# Patient Record
Sex: FEMALE | Race: OTHER | HISPANIC OR LATINO | URBAN - METROPOLITAN AREA
[De-identification: names, ages, dates, MRNs, and addresses within clinical notes are randomized per-mention and may not be internally consistent; named-entity substitution may affect disease eponyms.]

---

## 2017-04-19 ENCOUNTER — EMERGENCY (EMERGENCY)
Facility: HOSPITAL | Age: 57
LOS: 1 days | Discharge: HOME | End: 2017-04-19
Attending: EMERGENCY MEDICINE
Payer: MEDICAID

## 2017-04-19 VITALS
TEMPERATURE: 98 F | SYSTOLIC BLOOD PRESSURE: 127 MMHG | OXYGEN SATURATION: 100 % | HEIGHT: 62 IN | DIASTOLIC BLOOD PRESSURE: 75 MMHG | HEART RATE: 72 BPM | RESPIRATION RATE: 18 BRPM | WEIGHT: 139.99 LBS

## 2017-04-19 VITALS
DIASTOLIC BLOOD PRESSURE: 74 MMHG | OXYGEN SATURATION: 100 % | RESPIRATION RATE: 18 BRPM | TEMPERATURE: 98 F | SYSTOLIC BLOOD PRESSURE: 122 MMHG | HEART RATE: 68 BPM

## 2017-04-19 DIAGNOSIS — Z87.891 PERSONAL HISTORY OF NICOTINE DEPENDENCE: ICD-10-CM

## 2017-04-19 DIAGNOSIS — R07.89 OTHER CHEST PAIN: ICD-10-CM

## 2017-04-19 DIAGNOSIS — F32.9 MAJOR DEPRESSIVE DISORDER, SINGLE EPISODE, UNSPECIFIED: ICD-10-CM

## 2017-04-19 LAB
ALBUMIN SERPL ELPH-MCNC: 3.6 G/DL — SIGNIFICANT CHANGE UP (ref 3.5–5)
ALP SERPL-CCNC: 99 U/L — SIGNIFICANT CHANGE UP (ref 40–120)
ALT FLD-CCNC: 26 U/L DA — SIGNIFICANT CHANGE UP (ref 10–60)
ANION GAP SERPL CALC-SCNC: 8 MMOL/L — SIGNIFICANT CHANGE UP (ref 5–17)
AST SERPL-CCNC: 20 U/L — SIGNIFICANT CHANGE UP (ref 10–40)
BASOPHILS # BLD AUTO: 0.1 K/UL — SIGNIFICANT CHANGE UP (ref 0–0.2)
BASOPHILS NFR BLD AUTO: 1.1 % — SIGNIFICANT CHANGE UP (ref 0–2)
BILIRUB SERPL-MCNC: 0.2 MG/DL — SIGNIFICANT CHANGE UP (ref 0.2–1.2)
BUN SERPL-MCNC: 24 MG/DL — HIGH (ref 7–18)
CALCIUM SERPL-MCNC: 8.5 MG/DL — SIGNIFICANT CHANGE UP (ref 8.4–10.5)
CHLORIDE SERPL-SCNC: 106 MMOL/L — SIGNIFICANT CHANGE UP (ref 96–108)
CK MB BLD-MCNC: 1.4 % — SIGNIFICANT CHANGE UP (ref 0–3.5)
CK MB CFR SERPL CALC: 1.4 NG/ML — SIGNIFICANT CHANGE UP (ref 0–3.6)
CK SERPL-CCNC: 100 U/L — SIGNIFICANT CHANGE UP (ref 21–215)
CO2 SERPL-SCNC: 27 MMOL/L — SIGNIFICANT CHANGE UP (ref 22–31)
CREAT SERPL-MCNC: 1.01 MG/DL — SIGNIFICANT CHANGE UP (ref 0.5–1.3)
D DIMER BLD IA.RAPID-MCNC: <150 NG/ML DDU — SIGNIFICANT CHANGE UP
EOSINOPHIL # BLD AUTO: 0.2 K/UL — SIGNIFICANT CHANGE UP (ref 0–0.5)
EOSINOPHIL NFR BLD AUTO: 2.1 % — SIGNIFICANT CHANGE UP (ref 0–6)
GLUCOSE SERPL-MCNC: 102 MG/DL — HIGH (ref 70–99)
HCT VFR BLD CALC: 41.5 % — SIGNIFICANT CHANGE UP (ref 34.5–45)
HGB BLD-MCNC: 14 G/DL — SIGNIFICANT CHANGE UP (ref 11.5–15.5)
LIDOCAIN IGE QN: 231 U/L — SIGNIFICANT CHANGE UP (ref 73–393)
LYMPHOCYTES # BLD AUTO: 4.7 K/UL — HIGH (ref 1–3.3)
LYMPHOCYTES # BLD AUTO: 48.2 % — HIGH (ref 13–44)
MCHC RBC-ENTMCNC: 30.1 PG — SIGNIFICANT CHANGE UP (ref 27–34)
MCHC RBC-ENTMCNC: 33.7 GM/DL — SIGNIFICANT CHANGE UP (ref 32–36)
MCV RBC AUTO: 89.4 FL — SIGNIFICANT CHANGE UP (ref 80–100)
MONOCYTES # BLD AUTO: 0.6 K/UL — SIGNIFICANT CHANGE UP (ref 0–0.9)
MONOCYTES NFR BLD AUTO: 5.8 % — SIGNIFICANT CHANGE UP (ref 2–14)
NEUTROPHILS # BLD AUTO: 4.2 K/UL — SIGNIFICANT CHANGE UP (ref 1.8–7.4)
NEUTROPHILS NFR BLD AUTO: 42.8 % — LOW (ref 43–77)
NT-PROBNP SERPL-SCNC: 36 PG/ML — SIGNIFICANT CHANGE UP (ref 0–125)
PLATELET # BLD AUTO: 209 K/UL — SIGNIFICANT CHANGE UP (ref 150–400)
POTASSIUM SERPL-MCNC: 3.7 MMOL/L — SIGNIFICANT CHANGE UP (ref 3.5–5.3)
POTASSIUM SERPL-SCNC: 3.7 MMOL/L — SIGNIFICANT CHANGE UP (ref 3.5–5.3)
PROT SERPL-MCNC: 6.7 G/DL — SIGNIFICANT CHANGE UP (ref 6–8.3)
RBC # BLD: 4.64 M/UL — SIGNIFICANT CHANGE UP (ref 3.8–5.2)
RBC # FLD: 11.4 % — SIGNIFICANT CHANGE UP (ref 10.3–14.5)
SODIUM SERPL-SCNC: 141 MMOL/L — SIGNIFICANT CHANGE UP (ref 135–145)
TROPONIN I SERPL-MCNC: <0.015 NG/ML — SIGNIFICANT CHANGE UP (ref 0–0.04)
WBC # BLD: 9.8 K/UL — SIGNIFICANT CHANGE UP (ref 3.8–10.5)
WBC # FLD AUTO: 9.8 K/UL — SIGNIFICANT CHANGE UP (ref 3.8–10.5)

## 2017-04-19 PROCEDURE — 83690 ASSAY OF LIPASE: CPT

## 2017-04-19 PROCEDURE — 93005 ELECTROCARDIOGRAM TRACING: CPT

## 2017-04-19 PROCEDURE — 99283 EMERGENCY DEPT VISIT LOW MDM: CPT | Mod: 25

## 2017-04-19 PROCEDURE — 85027 COMPLETE CBC AUTOMATED: CPT

## 2017-04-19 PROCEDURE — 71020: CPT | Mod: 26

## 2017-04-19 PROCEDURE — 84484 ASSAY OF TROPONIN QUANT: CPT

## 2017-04-19 PROCEDURE — 80053 COMPREHEN METABOLIC PANEL: CPT

## 2017-04-19 PROCEDURE — 83880 ASSAY OF NATRIURETIC PEPTIDE: CPT

## 2017-04-19 PROCEDURE — 71046 X-RAY EXAM CHEST 2 VIEWS: CPT

## 2017-04-19 PROCEDURE — 82553 CREATINE MB FRACTION: CPT

## 2017-04-19 PROCEDURE — 85379 FIBRIN DEGRADATION QUANT: CPT

## 2017-04-19 PROCEDURE — 82550 ASSAY OF CK (CPK): CPT

## 2017-04-19 PROCEDURE — 99285 EMERGENCY DEPT VISIT HI MDM: CPT | Mod: 25

## 2017-04-19 RX ORDER — SODIUM CHLORIDE 9 MG/ML
3 INJECTION INTRAMUSCULAR; INTRAVENOUS; SUBCUTANEOUS ONCE
Qty: 0 | Refills: 0 | Status: COMPLETED | OUTPATIENT
Start: 2017-04-19 | End: 2017-04-19

## 2017-04-19 RX ADMIN — SODIUM CHLORIDE 3 MILLILITER(S): 9 INJECTION INTRAMUSCULAR; INTRAVENOUS; SUBCUTANEOUS at 04:18

## 2017-04-19 NOTE — ED PROVIDER NOTE - NS ED ROS FT
ROS: GENERAL: no fevers, +chills HEENT: no epistaxis, no eye pain, no ear, no throat pain CARDIAC: +chest pain, +shortness of breath PULM: no cough, +shortness of breath GI: +nausea, no vomiting, no diarrhea,  +abdominal pain, no hematemesis, no bright red blood per rectum : no dysuria, no hematuria EXTREMITIES: no arm pain, no leg pain, no back pain SKIN: no purpura, no petechiae NEURO: no headache, no neck pain, no loss of strength/sensation HEME: no easy bruising, no easy bleeding

## 2017-04-19 NOTE — ED PROVIDER NOTE - OBJECTIVE STATEMENT
57f pmhx depression p/w CP, abdo pain. started 20 min ago, woke pt from sleep vs. noted on waking, severe, across entire chest in addition to diffuse abdo pain, worse in epigastrium a/w SOB, nausea without vomiting. denies constipation/diarrhea, dysuria/hematuria. no sick contacts. went to bed in usual state of health. no FHx of CAD, never smoker.

## 2017-04-19 NOTE — ED PROVIDER NOTE - PROGRESS NOTE DETAILS
pt reports improvement upon arrival in ED labs, CXR, EKG all wnl, will d/c home, pt states sx resolved.

## 2017-04-19 NOTE — ED ADULT NURSE NOTE - OBJECTIVE STATEMENT
pt awake alert oriented x 3 not in distress,with complaint of chest pain woke up with pain this morning,no sob,with daughter at bedside

## 2017-04-19 NOTE — ED PROVIDER NOTE - MEDICAL DECISION MAKING DETAILS
57f pmhx depression p/w CP, abdo pain. started 20 min ago, woke pt from sleep vs. noted on waking, severe, across entire chest in addition to diffuse abdo pain, worse in epigastrium a/w SOB, nausea without vomiting. denies constipation/diarrhea, dysuria/hematuria. no sick contacts. went to bed in usual state of health. no FHx of CAD, never smoker. on PE, VSS, in moderate distress, CTA b/l, RRR, abdo soft, non-TTP, will send cardiac panel.

## 2019-12-06 NOTE — ED ADULT TRIAGE NOTE - MEANS OF ARRIVAL
Subjective:      Patient ID: Smitha Salinas is a 69 y.o. female.    Chief Complaint: Sore Throat    Sore Throat    This is a new problem. The current episode started yesterday. The problem has been gradually worsening. There has been no fever. Associated symptoms include congestion and coughing. Pertinent negatives include no abdominal pain, diarrhea, drooling, ear discharge, ear pain, headaches, hoarse voice, plugged ear sensation, neck pain, shortness of breath, stridor, swollen glands, trouble swallowing or vomiting. Treatments tried: OTC meds. The treatment provided moderate relief.   Knee Pain    There was no injury mechanism. The pain is present in the right knee and left knee. The pain has been worsening since onset. Pertinent negatives include no inability to bear weight, loss of motion, loss of sensation, muscle weakness, numbness or tingling. Nothing aggravates the symptoms. Treatments tried: tramadol. The treatment provided no relief.   Has had knee injections with Dr. Dick Coronado a pain management specialist.      Review of Systems   Constitutional: Negative for activity change, appetite change, chills, diaphoresis, fatigue, fever and unexpected weight change.   HENT: Positive for congestion, postnasal drip, rhinorrhea and sore throat. Negative for drooling, ear discharge, ear pain, hearing loss, hoarse voice, sinus pressure, sinus pain, sneezing, trouble swallowing and voice change.    Eyes: Negative.  Negative for visual disturbance.   Respiratory: Positive for cough. Negative for choking, chest tightness, shortness of breath and stridor.    Cardiovascular: Negative for chest pain, palpitations and leg swelling.   Gastrointestinal: Negative for abdominal distention, abdominal pain, blood in stool, constipation, diarrhea, nausea and vomiting.   Endocrine: Negative for cold intolerance, heat intolerance, polydipsia and polyuria.   Genitourinary: Negative.  Negative for difficulty urinating and  "frequency.   Musculoskeletal: Positive for arthralgias. Negative for back pain, gait problem, joint swelling, myalgias, neck pain and neck stiffness.   Skin: Negative for color change, pallor, rash and wound.   Neurological: Negative for dizziness, tingling, tremors, weakness, light-headedness, numbness and headaches.   Hematological: Negative for adenopathy.   Psychiatric/Behavioral: Negative for behavioral problems, confusion, self-injury, sleep disturbance and suicidal ideas. The patient is not nervous/anxious.      Objective:   /70 (BP Location: Left arm, Patient Position: Sitting, BP Method: Large (Manual))   Pulse 60   Temp 97.8 °F (36.6 °C) (Tympanic)   Ht 5' 5" (1.651 m)   Wt 85 kg (187 lb 6.3 oz)   SpO2 98%   BMI 31.18 kg/m²     Physical Exam   Constitutional: She is oriented to person, place, and time. She appears well-developed and well-nourished. No distress.   HENT:   Head: Normocephalic and atraumatic.   Right Ear: External ear normal.   Left Ear: External ear normal.   Nose: Mucosal edema and rhinorrhea present. Right sinus exhibits no maxillary sinus tenderness and no frontal sinus tenderness. Left sinus exhibits no maxillary sinus tenderness and no frontal sinus tenderness.   Mouth/Throat: Uvula is midline and mucous membranes are normal. Oropharyngeal exudate and posterior oropharyngeal erythema present. No posterior oropharyngeal edema. No tonsillar exudate.   Eyes: Pupils are equal, round, and reactive to light. Conjunctivae and EOM are normal.   Neck: Normal range of motion. Neck supple.   Cardiovascular: Normal rate, regular rhythm and normal heart sounds. Exam reveals no gallop and no friction rub.   No murmur heard.  Pulmonary/Chest: Effort normal and breath sounds normal. No respiratory distress. She has no wheezes. She has no rales. She exhibits no tenderness.   Abdominal: Soft. She exhibits no distension. There is no tenderness.   Musculoskeletal: Normal range of motion. "   Lymphadenopathy:     She has no cervical adenopathy.   Neurological: She is alert and oriented to person, place, and time.   Skin: Skin is warm and dry. She is not diaphoretic.   Psychiatric: She has a normal mood and affect. Her behavior is normal. Judgment and thought content normal.   Vitals reviewed.      Assessment:     1. Acute pharyngitis, unspecified etiology    2. Allergic rhinitis, unspecified seasonality, unspecified trigger    3. Chronic pain of both knees      Plan:   Acute pharyngitis, unspecified etiology  -     betamethasone acetate-betamethasone sodium phosphate injection 9 mg    Allergic rhinitis, unspecified seasonality, unspecified trigger  -     fluticasone propionate (FLONASE) 50 mcg/actuation nasal spray; 1 spray (50 mcg total) by Each Nostril route once daily.  Dispense: 1 Bottle; Refill: 0    Chronic pain of both knees    -cont. Follow up with rheum and pain specialist.    Follow up if symptoms worsen or fail to improve.       ambulatory

## 2020-01-23 ENCOUNTER — EMERGENCY (EMERGENCY)
Facility: HOSPITAL | Age: 60
LOS: 1 days | Discharge: HOME | End: 2020-01-23
Attending: EMERGENCY MEDICINE
Payer: MEDICAID

## 2020-01-23 VITALS
TEMPERATURE: 98 F | OXYGEN SATURATION: 98 % | SYSTOLIC BLOOD PRESSURE: 125 MMHG | HEART RATE: 76 BPM | DIASTOLIC BLOOD PRESSURE: 85 MMHG | RESPIRATION RATE: 16 BRPM | WEIGHT: 130.07 LBS

## 2020-01-23 LAB
APPEARANCE UR: CLEAR — SIGNIFICANT CHANGE UP
BILIRUB UR-MCNC: NEGATIVE — SIGNIFICANT CHANGE UP
COLOR SPEC: YELLOW — SIGNIFICANT CHANGE UP
DIFF PNL FLD: NEGATIVE — SIGNIFICANT CHANGE UP
GLUCOSE UR QL: NEGATIVE — SIGNIFICANT CHANGE UP
KETONES UR-MCNC: NEGATIVE — SIGNIFICANT CHANGE UP
LEUKOCYTE ESTERASE UR-ACNC: NEGATIVE — SIGNIFICANT CHANGE UP
NITRITE UR-MCNC: NEGATIVE — SIGNIFICANT CHANGE UP
PH UR: 7 — SIGNIFICANT CHANGE UP (ref 5–8)
PROT UR-MCNC: NEGATIVE — SIGNIFICANT CHANGE UP
SP GR SPEC: 1.01 — SIGNIFICANT CHANGE UP (ref 1.01–1.02)
UROBILINOGEN FLD QL: NEGATIVE — SIGNIFICANT CHANGE UP

## 2020-01-23 PROCEDURE — 99283 EMERGENCY DEPT VISIT LOW MDM: CPT

## 2020-01-23 RX ORDER — MECLIZINE HCL 12.5 MG
1 TABLET ORAL
Qty: 15 | Refills: 0
Start: 2020-01-23

## 2020-01-23 RX ORDER — CEFPODOXIME PROXETIL 100 MG
200 TABLET ORAL EVERY 12 HOURS
Refills: 0 | Status: DISCONTINUED | OUTPATIENT
Start: 2020-01-23 | End: 2020-02-03

## 2020-01-23 RX ORDER — CEFPODOXIME PROXETIL 100 MG
1 TABLET ORAL
Qty: 20 | Refills: 0
Start: 2020-01-23 | End: 2020-02-01

## 2020-01-23 RX ORDER — MECLIZINE HCL 12.5 MG
50 TABLET ORAL ONCE
Refills: 0 | Status: COMPLETED | OUTPATIENT
Start: 2020-01-23 | End: 2020-01-23

## 2020-01-23 NOTE — ED PROVIDER NOTE - OBJECTIVE STATEMENT
59 year old female with PMHx of depression and no pertinent PSHx presents to the ED with complaints of vertigo, bilateral ear pain, dysuria, and flank pain. Patient reports that she was diagnosed with an ear infection one month ago, and that at the time she was prescribed Augmentin after which her symptoms resolved. Patient states that she did not have any vertigo at the time of her ear infection, and otherwise does not have any history of vertigo. Patient states that today her vertigo is worsened when she looks to the right. Patient otherwise denies any weakness, numbness, dysarthria, trouble with speech, difficulty swallowing, vision changes, and all other acute complaints. NKDA.

## 2020-01-23 NOTE — ED PROVIDER NOTE - PATIENT PORTAL LINK FT
You can access the FollowMyHealth Patient Portal offered by [facility] by registering at the following website: [email or web address]. By joining [employer]’s FollowMyHealth portal, you will also be able to view your health information using other applications (apps) compatible with our system.

## 2020-01-23 NOTE — ED PROVIDER NOTE - PROGRESS NOTE DETAILS
(Eugene) - s/o to f/u administration of meds. Pt feeling better. Neuro exam wnl. Ambulatory in ED. Will dc. Discussed indications for patient return to ED. Patient understood.

## 2020-01-23 NOTE — ED PROVIDER NOTE - CLINICAL SUMMARY MEDICAL DECISION MAKING FREE TEXT BOX
Patient will be treated for otitis. Will check urine and treat for pyelo as well. Half somersault did not improve patient's vertigo. Will give meclizine and discharge with Meclizine.

## 2020-01-23 NOTE — ED PROVIDER NOTE - NSFOLLOWUPINSTRUCTIONS_ED_ALL_ED_FT
VERTIGO - AfterCare(R) Instructions(ER/ED)     Vertigo    WHAT YOU NEED TO KNOW:    Vertigo is a condition that causes you to feel dizzy. You may feel that you or everything around you is moving or spinning. You may also feel like you are being pulled down or toward your side.     DISCHARGE INSTRUCTIONS:    Return to the emergency department if:     You have a headache and a stiff neck.      You have shaking chills and a fever.       You vomit over and over with no relief.       You have blood, pus, or fluid coming out of your ears.      You are confused.     Contact your healthcare provider if:     Your symptoms do not get better with treatment.       You have questions about your condition or care.    Medicines:     Medicine may be given to help relieve your symptoms.      Take your medicine as directed. Contact your healthcare provider if you think your medicine is not helping or if you have side effects. Tell him or her if you are allergic to any medicine. Keep a list of the medicines, vitamins, and herbs you take. Include the amounts, and when and why you take them. Bring the list or the pill bottles to follow-up visits. Carry your medicine list with you in case of an emergency.    Manage your symptoms:     Do not drive, walk without help, or operate heavy machinery when you are dizzy.       Move slowly when you move from one position to another position. Get up slowly from sitting or lying down. Sit or lie down right away if you feel dizzy.      Drink plenty of liquids. Liquids help prevent dehydration. Ask how much liquid to drink each day and which liquids are best for you.      Vestibular and balance rehabilitation therapy (VBRT) is used to teach you exercises to improve your balance and strength. These exercises may help decrease your vertigo and improve your balance. Ask for more information about this therapy.    Follow up with your healthcare provider as directed: Write down your questions so you remember to ask them during your visits.        © Copyright [employer] 2020       back to top                      © Copyright [employer] 2020

## 2020-01-24 PROBLEM — F32.9 MAJOR DEPRESSIVE DISORDER, SINGLE EPISODE, UNSPECIFIED: Chronic | Status: ACTIVE | Noted: 2017-04-19

## 2020-01-24 PROCEDURE — 99283 EMERGENCY DEPT VISIT LOW MDM: CPT

## 2020-01-24 PROCEDURE — 81003 URINALYSIS AUTO W/O SCOPE: CPT

## 2020-01-24 PROCEDURE — 87086 URINE CULTURE/COLONY COUNT: CPT

## 2020-01-24 RX ADMIN — Medication 200 MILLIGRAM(S): at 00:42

## 2020-01-24 RX ADMIN — Medication 50 MILLIGRAM(S): at 00:42

## 2020-01-25 LAB
CULTURE RESULTS: NO GROWTH — SIGNIFICANT CHANGE UP
SPECIMEN SOURCE: SIGNIFICANT CHANGE UP

## 2020-08-14 ENCOUNTER — EMERGENCY (EMERGENCY)
Facility: HOSPITAL | Age: 60
LOS: 1 days | Discharge: HOME | End: 2020-08-14
Attending: EMERGENCY MEDICINE
Payer: MEDICAID

## 2020-08-14 VITALS
TEMPERATURE: 98 F | HEIGHT: 61 IN | WEIGHT: 134.92 LBS | RESPIRATION RATE: 22 BRPM | DIASTOLIC BLOOD PRESSURE: 87 MMHG | OXYGEN SATURATION: 99 % | HEART RATE: 85 BPM | SYSTOLIC BLOOD PRESSURE: 137 MMHG

## 2020-08-14 VITALS
OXYGEN SATURATION: 99 % | TEMPERATURE: 98 F | RESPIRATION RATE: 22 BRPM | DIASTOLIC BLOOD PRESSURE: 92 MMHG | HEART RATE: 79 BPM | SYSTOLIC BLOOD PRESSURE: 131 MMHG

## 2020-08-14 LAB
ALBUMIN SERPL ELPH-MCNC: 3.5 G/DL — SIGNIFICANT CHANGE UP (ref 3.5–5)
ALP SERPL-CCNC: 95 U/L — SIGNIFICANT CHANGE UP (ref 40–120)
ALT FLD-CCNC: 23 U/L DA — SIGNIFICANT CHANGE UP (ref 10–60)
ANION GAP SERPL CALC-SCNC: 4 MMOL/L — LOW (ref 5–17)
APPEARANCE UR: CLEAR — SIGNIFICANT CHANGE UP
AST SERPL-CCNC: 23 U/L — SIGNIFICANT CHANGE UP (ref 10–40)
BASOPHILS # BLD AUTO: 0.08 K/UL — SIGNIFICANT CHANGE UP (ref 0–0.2)
BASOPHILS NFR BLD AUTO: 1 % — SIGNIFICANT CHANGE UP (ref 0–2)
BILIRUB SERPL-MCNC: 0.2 MG/DL — SIGNIFICANT CHANGE UP (ref 0.2–1.2)
BILIRUB UR-MCNC: NEGATIVE — SIGNIFICANT CHANGE UP
BUN SERPL-MCNC: 15 MG/DL — SIGNIFICANT CHANGE UP (ref 7–18)
CALCIUM SERPL-MCNC: 8.6 MG/DL — SIGNIFICANT CHANGE UP (ref 8.4–10.5)
CHLORIDE SERPL-SCNC: 104 MMOL/L — SIGNIFICANT CHANGE UP (ref 96–108)
CO2 SERPL-SCNC: 30 MMOL/L — SIGNIFICANT CHANGE UP (ref 22–31)
COLOR SPEC: YELLOW — SIGNIFICANT CHANGE UP
CREAT SERPL-MCNC: 0.82 MG/DL — SIGNIFICANT CHANGE UP (ref 0.5–1.3)
DIFF PNL FLD: NEGATIVE — SIGNIFICANT CHANGE UP
EOSINOPHIL # BLD AUTO: 0.33 K/UL — SIGNIFICANT CHANGE UP (ref 0–0.5)
EOSINOPHIL NFR BLD AUTO: 4.2 % — SIGNIFICANT CHANGE UP (ref 0–6)
GLUCOSE SERPL-MCNC: 90 MG/DL — SIGNIFICANT CHANGE UP (ref 70–99)
GLUCOSE UR QL: NEGATIVE — SIGNIFICANT CHANGE UP
HCT VFR BLD CALC: 42.3 % — SIGNIFICANT CHANGE UP (ref 34.5–45)
HGB BLD-MCNC: 14.2 G/DL — SIGNIFICANT CHANGE UP (ref 11.5–15.5)
IMM GRANULOCYTES NFR BLD AUTO: 0.4 % — SIGNIFICANT CHANGE UP (ref 0–1.5)
KETONES UR-MCNC: NEGATIVE — SIGNIFICANT CHANGE UP
LEUKOCYTE ESTERASE UR-ACNC: NEGATIVE — SIGNIFICANT CHANGE UP
LIDOCAIN IGE QN: 208 U/L — SIGNIFICANT CHANGE UP (ref 73–393)
LYMPHOCYTES # BLD AUTO: 2.97 K/UL — SIGNIFICANT CHANGE UP (ref 1–3.3)
LYMPHOCYTES # BLD AUTO: 37.7 % — SIGNIFICANT CHANGE UP (ref 13–44)
MCHC RBC-ENTMCNC: 30.1 PG — SIGNIFICANT CHANGE UP (ref 27–34)
MCHC RBC-ENTMCNC: 33.6 GM/DL — SIGNIFICANT CHANGE UP (ref 32–36)
MCV RBC AUTO: 89.6 FL — SIGNIFICANT CHANGE UP (ref 80–100)
MONOCYTES # BLD AUTO: 1.04 K/UL — HIGH (ref 0–0.9)
MONOCYTES NFR BLD AUTO: 13.2 % — SIGNIFICANT CHANGE UP (ref 2–14)
NEUTROPHILS # BLD AUTO: 3.42 K/UL — SIGNIFICANT CHANGE UP (ref 1.8–7.4)
NEUTROPHILS NFR BLD AUTO: 43.5 % — SIGNIFICANT CHANGE UP (ref 43–77)
NITRITE UR-MCNC: NEGATIVE — SIGNIFICANT CHANGE UP
NRBC # BLD: 0 /100 WBCS — SIGNIFICANT CHANGE UP (ref 0–0)
PH UR: 7 — SIGNIFICANT CHANGE UP (ref 5–8)
PLATELET # BLD AUTO: 188 K/UL — SIGNIFICANT CHANGE UP (ref 150–400)
POTASSIUM SERPL-MCNC: 4.4 MMOL/L — SIGNIFICANT CHANGE UP (ref 3.5–5.3)
POTASSIUM SERPL-SCNC: 4.4 MMOL/L — SIGNIFICANT CHANGE UP (ref 3.5–5.3)
PROT SERPL-MCNC: 7 G/DL — SIGNIFICANT CHANGE UP (ref 6–8.3)
PROT UR-MCNC: NEGATIVE — SIGNIFICANT CHANGE UP
RBC # BLD: 4.72 M/UL — SIGNIFICANT CHANGE UP (ref 3.8–5.2)
RBC # FLD: 12.4 % — SIGNIFICANT CHANGE UP (ref 10.3–14.5)
SARS-COV-2 RNA SPEC QL NAA+PROBE: SIGNIFICANT CHANGE UP
SODIUM SERPL-SCNC: 138 MMOL/L — SIGNIFICANT CHANGE UP (ref 135–145)
SP GR SPEC: 1 — LOW (ref 1.01–1.02)
TROPONIN I SERPL-MCNC: <0.015 NG/ML — SIGNIFICANT CHANGE UP (ref 0–0.04)
UROBILINOGEN FLD QL: NEGATIVE — SIGNIFICANT CHANGE UP
WBC # BLD: 7.87 K/UL — SIGNIFICANT CHANGE UP (ref 3.8–10.5)
WBC # FLD AUTO: 7.87 K/UL — SIGNIFICANT CHANGE UP (ref 3.8–10.5)

## 2020-08-14 PROCEDURE — 83690 ASSAY OF LIPASE: CPT

## 2020-08-14 PROCEDURE — 80053 COMPREHEN METABOLIC PANEL: CPT

## 2020-08-14 PROCEDURE — 99285 EMERGENCY DEPT VISIT HI MDM: CPT | Mod: 25

## 2020-08-14 PROCEDURE — 93010 ELECTROCARDIOGRAM REPORT: CPT | Mod: 76

## 2020-08-14 PROCEDURE — 36415 COLL VENOUS BLD VENIPUNCTURE: CPT

## 2020-08-14 PROCEDURE — 84484 ASSAY OF TROPONIN QUANT: CPT

## 2020-08-14 PROCEDURE — 71275 CT ANGIOGRAPHY CHEST: CPT

## 2020-08-14 PROCEDURE — 71275 CT ANGIOGRAPHY CHEST: CPT | Mod: 26

## 2020-08-14 PROCEDURE — 87635 SARS-COV-2 COVID-19 AMP PRB: CPT

## 2020-08-14 PROCEDURE — 86769 SARS-COV-2 COVID-19 ANTIBODY: CPT

## 2020-08-14 PROCEDURE — 81003 URINALYSIS AUTO W/O SCOPE: CPT

## 2020-08-14 PROCEDURE — 99284 EMERGENCY DEPT VISIT MOD MDM: CPT | Mod: 25

## 2020-08-14 PROCEDURE — 94640 AIRWAY INHALATION TREATMENT: CPT

## 2020-08-14 PROCEDURE — 85027 COMPLETE CBC AUTOMATED: CPT

## 2020-08-14 PROCEDURE — 93005 ELECTROCARDIOGRAM TRACING: CPT

## 2020-08-14 RX ORDER — ALBUTEROL 90 UG/1
2 AEROSOL, METERED ORAL
Qty: 1 | Refills: 0
Start: 2020-08-14 | End: 2020-08-20

## 2020-08-14 RX ORDER — IPRATROPIUM/ALBUTEROL SULFATE 18-103MCG
3 AEROSOL WITH ADAPTER (GRAM) INHALATION ONCE
Refills: 0 | Status: COMPLETED | OUTPATIENT
Start: 2020-08-14 | End: 2020-08-14

## 2020-08-14 RX ADMIN — Medication 3 MILLILITER(S): at 19:49

## 2020-08-14 NOTE — ED PROVIDER NOTE - PATIENT PORTAL LINK FT
You can access the FollowMyHealth Patient Portal offered by [facility] by registering at the following website: [email or web address]. By joining [employer]’s FollowMyHealth portal, you will also be able to view your health information using other applications (apps) compatible with our system.

## 2020-08-14 NOTE — ED ADULT NURSE NOTE - NSIMPLEMENTINTERV_GEN_ALL_ED
Implemented All Universal Safety Interventions:  [place] to call system. Call bell, personal items and telephone within reach. Instruct patient to call for assistance. Room bathroom lighting operational. Non-slip footwear when patient is off stretcher. Physically safe environment: no spills, clutter or unnecessary equipment. Stretcher in lowest position, wheels locked, appropriate side rails in place.

## 2020-08-14 NOTE — ED PROVIDER NOTE - CLINICAL SUMMARY MEDICAL DECISION MAKING FREE TEXT BOX
60 year old female presenting with chest pressure sensation and shortness of breath since last night. Will order labs, EKG, and chest CT to r/o PE. Will treat with steroids and albuterol. reassess. 60 year old female presenting with chest pressure sensation and shortness of breath since last night. Will order labs, EKG, and chest CT to r/o PE. Will treat with steroids and albuterol. reassess.   pt felt better with treatment she never was testeed for covid

## 2020-08-15 LAB
SARS-COV-2 IGG SERPL QL IA: NEGATIVE — SIGNIFICANT CHANGE UP
SARS-COV-2 IGM SERPL IA-ACNC: 0.83 INDEX — SIGNIFICANT CHANGE UP

## 2020-09-06 NOTE — ED ADULT TRIAGE NOTE - AS O2 DELIVERY
AP chest x-ray

 

HISTORY: Shortness of breath and cough.

 

COMPARISON: Chest x-ray August 10, 2020 and priors.

 

FINDINGS: 3-lead cardiac pacemaker. Cardiomegaly stable. Apical emphysema.

Prominence of the pulmonary vasculature particularly at the right lower 

hilum although this is similar to prior CT imaging. The right upper lobe 

fissural nodular density on the prior CT study is difficult to visualize 

on this x-ray. There are increased perihilar lower lobe interstitial and 

alveolar opacities since the prior exam. Limited visualization of the 

right diaphragm a small right pleural effusion is not excluded.

 

IMPRESSION: Right hilar and lower lobe interstitial and alveolar 

infiltrates have increased since prior x-rays from August 2020 may 

represent pulmonary edema or multilobar pneumonia. There may be a small 

right pleural effusion. See above.

 

Electronically signed by: Bill Davies MD (9/6/2020 11:22 AM) 

[id number] room air

## 2021-05-21 ENCOUNTER — EMERGENCY (EMERGENCY)
Facility: HOSPITAL | Age: 61
LOS: 1 days | Discharge: HOME | End: 2021-05-21
Attending: EMERGENCY MEDICINE
Payer: MEDICAID

## 2021-05-21 VITALS
OXYGEN SATURATION: 97 % | RESPIRATION RATE: 17 BRPM | HEART RATE: 84 BPM | DIASTOLIC BLOOD PRESSURE: 94 MMHG | TEMPERATURE: 97 F | SYSTOLIC BLOOD PRESSURE: 142 MMHG | WEIGHT: 141.1 LBS | HEIGHT: 61 IN

## 2021-05-21 PROCEDURE — 73564 X-RAY EXAM KNEE 4 OR MORE: CPT

## 2021-05-21 PROCEDURE — 27560 TREAT KNEECAP DISLOCATION: CPT | Mod: 54

## 2021-05-21 PROCEDURE — 73564 X-RAY EXAM KNEE 4 OR MORE: CPT | Mod: 26,RT

## 2021-05-21 PROCEDURE — 99283 EMERGENCY DEPT VISIT LOW MDM: CPT | Mod: 25

## 2021-05-21 PROCEDURE — 99284 EMERGENCY DEPT VISIT MOD MDM: CPT | Mod: 57

## 2021-05-21 NOTE — ED PROVIDER NOTE - OBJECTIVE STATEMENT
61 y.o female with no significant PMhx and no PSHx presents to the ED s/p walking up stairs earlier today and having her knee spontaneously pop in and out. Patient states she wrapped it in a ORC knee wrap and a few hours later developed pain, not as severe as the first time . Patient states she is not able to fully extend her leg and has difficulty walking. Patient states she never had this before. Patient denies any other acute complaints. NKDA

## 2021-05-21 NOTE — ED PROVIDER NOTE - PATIENT PORTAL LINK FT
You can access the FollowMyHealth Patient Portal offered by [facility] by registering at the following website: [email or web address]. By joining [employer]’s FollowMyHealth portal, you will also be able to view your health information using other applications (apps) compatible with our system.

## 2021-06-10 PROBLEM — Z00.00 ENCOUNTER FOR PREVENTIVE HEALTH EXAMINATION: Status: ACTIVE | Noted: 2021-06-10

## 2021-06-17 DIAGNOSIS — M17.11 UNILATERAL PRIMARY OSTEOARTHRITIS, RIGHT KNEE: ICD-10-CM

## 2021-06-23 ENCOUNTER — APPOINTMENT (OUTPATIENT)
Dept: ORTHOPEDIC SURGERY | Facility: CLINIC | Age: 61
End: 2021-06-23

## 2022-07-08 NOTE — ED ADULT TRIAGE NOTE - INTERNATIONAL TRAVEL
Pt would like to set up after surgery wound care as he does not feel he would be able to do it himself and he does not have anyone to assist.
Returned call to patient and verified using 2 patient identifiers. He was wondering if he could setup wound care for after the surgery because he does not feel that he will be able to do it and the surgery is scheduled for Monday. Informed him that Dr. Daphne Braxton did mention his concern and that he would need to consult with Case Management in regards to this matter. Pt was wondering when will that take place since the surgery is Monday. Informed him more than likely Monday. Pt states that he does remember him mentioning something about that at the visit today. Pt states will just wait until Monday and go from there. No other concerns were voiced.
No

## 2022-11-05 ENCOUNTER — EMERGENCY (EMERGENCY)
Facility: HOSPITAL | Age: 62
LOS: 1 days | Discharge: HOME | End: 2022-11-05
Attending: EMERGENCY MEDICINE
Payer: MEDICAID

## 2022-11-05 VITALS
TEMPERATURE: 98 F | HEART RATE: 70 BPM | WEIGHT: 136.03 LBS | HEIGHT: 60 IN | RESPIRATION RATE: 18 BRPM | DIASTOLIC BLOOD PRESSURE: 83 MMHG | OXYGEN SATURATION: 100 % | SYSTOLIC BLOOD PRESSURE: 137 MMHG

## 2022-11-05 PROCEDURE — 99283 EMERGENCY DEPT VISIT LOW MDM: CPT | Mod: 25

## 2022-11-05 PROCEDURE — 73610 X-RAY EXAM OF ANKLE: CPT | Mod: 26,RT

## 2022-11-05 PROCEDURE — 99283 EMERGENCY DEPT VISIT LOW MDM: CPT

## 2022-11-05 PROCEDURE — 73610 X-RAY EXAM OF ANKLE: CPT

## 2022-11-05 RX ORDER — IBUPROFEN 200 MG
600 TABLET ORAL ONCE
Refills: 0 | Status: COMPLETED | OUTPATIENT
Start: 2022-11-05 | End: 2022-11-05

## 2022-11-05 RX ADMIN — Medication 600 MILLIGRAM(S): at 20:14

## 2022-11-05 RX ADMIN — Medication 600 MILLIGRAM(S): at 19:44

## 2022-11-05 NOTE — ED ADULT NURSE NOTE - OBJECTIVE STATEMENT
pt  is a 63 y/o  female  with  c/o  rt  ankle pain  pt  states  she  fell on  a stair  and  hurt  her  ankle.

## 2022-11-05 NOTE — ED PROVIDER NOTE - WORK/EXCUSE FORM DATE
Contacted patient again to schedule. Appointment scheduled. Patient made aware of clinic location and arrival time. He denied any additional questions or concerns at this time.     Encounter Information      Provider Department Encounter # Center   6/23/2020 2:20 PM [facility] [phone]   Display Notes     ventral hernia         08-Nov-2022

## 2022-11-05 NOTE — ED PROVIDER NOTE - IV ALTEPLASE EXCL REL HIDDEN
Patient:   SRI CHILDS            MRN: [id number]            FIN: [id number]              Age:   41 years     Sex:  FEMALE     :  79   Associated Diagnoses:   None   Author:   BARBARA BHATTI     Basic Information   Reason for admission:  Labor.    Informed consent obtained for anesthesia.  Gestational Age:          * Note: EGA calculated as of 2019    NARINDER: 2019 EGA*: 38 weeks            Type: Authoritative Method Date: 2019    Method: Unknown (2019)  Confirmation: Confirmed  Description: --  Comments: --  Entered by: Lorelei Turcios on 2019     Other NARINDER Calculations for this Pregnancy:   No additional NARINDER calculations have been recorded for this pregnancy  .      History of Present Illness   The patient presents with contractions.  Uterine contractions are 3  minutes apart and lasting  60  seconds.  Patient is    Para Information:   : 4   Para Term: 2   Para : 0   Para Abortions: 1   Para Livin.       Review of Systems   Constitutional:  Negative.    Eye:  Negative.    Ear/Nose/Mouth/Throat:  Negative.    Respiratory:  Negative.    Cardiovascular:  Negative.    Gastrointestinal:  Negative.    Genitourinary:  Negative.    Gynecologic:  Negative.    Hematology/Lymphatics:  Negative.    Endocrine:  Negative.    Immunologic:  Negative.    Musculoskeletal:  Negative.    Integumentary:  Negative.    Neurologic:  Negative.      Health Status   Allergies:    Allergic Reactions (All)  Medium  Sulfa drugs- Pruritis and hives.  Severity Not Documented  Guava and Dr. Pepper- Hives.  Canceled/Inactive Reactions (All)  Severity Not Documented  SulfADIAZINE- Desitin creamy diaper rash ointment.     Histories   Family History:    Stroke  MOTHER  Hypertension  FATHER      Prenatal History   Lab from flowsheet   19 07:27 CDT WBC 10.3 THOUSAND/mcL    Hemoglobin 10.5 gm/dL  LOW    Hematocrit 31.7 %  LOW    Platelet 143 THOUSAND/mcL     Current pregnancy    No complications.     Procedure history:    Abdominal wall procedure (SNOMED CT [id number]).   Social History       Tobacco  Details: Smoked/Smokeless Tobacco Last 30 Days: No.  Use: Never smoker.  .       Physical Examination   VS/Measurements       Vitals between:   17-AUG-2019 11:58:01   TO   18-AUG-2019 11:58:01                   LAST RESULT MINIMUM MAXIMUM  Temperature 36.3 35.9 36.8  Heart Rate 60 55 76  Respiratory Rate 16 16 18  NISBP           111 100 130  NIDBP           71 62 87  NIMBP           84 79 101  SpO2                    99 99 99    General:  Alert and oriented.    Respiratory:  Respirations are non-labored.    Cardiovascular:  Normal rate, Regular rhythm.    Gastrointestinal:  Soft.    Obstetric Exam     Contractions noted: every  3  minutes, lasting  60  seconds, regular pattern.    Uterus: fundal height 37  cm.     Kaplan/ Baby A fetal evaluation: assessment of fetal heart tracing reassuring heart rate, fetal presentation/ presenting part.    Cervix: dilated 6  cm, 100  % effaced, station/ evidence of fetal descent 0 - head engaged, membrane status ruptured not spontaneously, amniotic fluid moderate amount.    Musculoskeletal     Normal range of motion.     Integumentary:  Warm, Dry.      Review / Management   OB Results Review:  Current Pregnancy   08/17/19 10:10 CDT Blood Type Transcribed~ A Positive    HBsAG Transcribed~ Negative    RPR Transcribed~ Non Reactive    Rubella Transcribed~ Immune    HIV Transcribed~ Negative    HIV Transcribed 2~ Negative    Group B Strep Transcribed~ Negative    Chlamydia Transcribed~ Negative    Gonorrhea Transcribed~ Negative   .    Condition:  Stable.      Impression and Plan   Fetal condition:  Reassuring fetal heart rate.    Maternal condition:  Stable.    Plan     Admit.   show

## 2022-11-05 NOTE — ED PROVIDER NOTE - PATIENT PORTAL LINK FT
You can access the FollowMyHealth Patient Portal offered by [facility] by registering at the following website: [email or web address]. By joining [employer]’s FollowMyHealth portal, you will also be able to view your health information using other applications (apps) compatible with our system.

## 2022-11-05 NOTE — ED PROVIDER NOTE - NSFOLLOWUPCLINICS_GEN_ALL_ED_FT
[facility]  Podiatry/Wound Care  [address] [place] [address]  Phone: [phone]  Fax: [phone]

## 2022-11-05 NOTE — ED ADULT NURSE NOTE - CINV DISCH TEACH PARTICIP

## 2022-11-05 NOTE — ED ADULT NURSE NOTE - NSIMPLEMENTINTERV_GEN_ALL_ED
Implemented All Universal Safety Interventions:  [place] to call system. Call bell, personal items and telephone within reach. Instruct patient to call for assistance. Room bathroom lighting operational. Non-slip footwear when patient is off stretcher. Physically safe environment: no spills, clutter or unnecessary equipment. Stretcher in lowest position, wheels locked, appropriate side rails in place.

## 2022-11-05 NOTE — ED PROVIDER NOTE - OBJECTIVE STATEMENT
[id number] aidan  62 year old female with no pmhx presents with right ankle pain. Reports missing last step and twisted her ankle yesterday morning. Took Tylenol and Ibuprofen for some relief. Still notes severe pain on ambulation. No numbness or tingling.  NKDA.

## 2022-11-05 NOTE — ED PROVIDER NOTE - NSFOLLOWUPINSTRUCTIONS_ED_ALL_ED_FT
Seguimiento con podología si el dolor dura más de 1 semana.    Puede rosalba motrin/tylenol según sea necesario para el dolor.    Si experimenta síntomas nuevos o que empeoran, o si está preocupado, siempre puede regresar a la emergencia para abdelrahman reevaluación.    Follow up with podiatry if you pain lasts longer than 1 week.     You can take motrin/tylenol as needed for pain.    If you experience any new or worsening symptoms or if you are concerned you can always come back to the emergency for a re-evaluation.      Esguince de tobillo    Ankle Sprain    Illustration of an ankle sprain caused by a foot turning outward and a foot turning inward.    El esguince de tobillo es la distensión o el desgarro de un ligamento del tobillo. Los ligamentos son tejidos que conectan los huesos.    Los dos tipos de esguince de tobillo más frecuentes son los siguientes:  •Esguince por inversión. [place] sucede cuando el pie gira hacia adentro y el tobillo gira hacia afuera. Afecta el ligamento de la parte externa del pie (ligamento lateral).      •Esguince por eversión. [place] sucede cuando el pie gira hacia afuera y el tobillo gira hacia adentro. Afecta el ligamento de la parte interna del pie (ligamento medial).        ¿Cuáles son las causas?    A menudo, esta afección es consecuencia de girar o torcer accidentalmente el tobillo.      ¿Qué incrementa el riesgo?    Es más probable que tenga esta afección si practica deportes.      ¿Cuáles son los signos o síntomas?  Comparison of a normal ankle and an ankle that is swollen and bruised.   Los síntomas de esta afección incluyen:  •Dolor en el tobillo.      •Hinchazón.      •Moretones. Estos pueden aparecer inmediatamente después de sufrir el esguince de tobillo, o de 1 a 2 días después.      •Problemas para estar de pie o caminar, en especial cuando gira o cambia de dirección.        ¿Cómo se diagnostica?    Esta afección se diagnostica mediante:  •Un examen físico. Cheryl el examen, el médico le presionará ciertas partes del pie y el tobillo e intentará moverlas de formas determinadas.      •Radiografías. Es posible que le tomen estas imágenes para determinar la gravedad del esguince y para detectar si hay huesos fracturados.        ¿Cómo se trata?    El tratamiento de esta afección puede incluir:  •Un dispositivo ortopédico o abdelrahman férula. Se utiliza para evitar los movimientos del tobillo hasta que se cure.      •Abdelrahman venda elástica. Se utiliza para sostener el tobillo.      •Muletas.      •Analgésicos.      •Cirugía. [place] puede ser necesario si el esguince es grave.      •Fisioterapia. Puede ayudar a mejorar la amplitud de movimiento del tobillo.        Siga estas instrucciones en christensen casa:    Si tiene un dispositivo ortopédico o abdelrahman férula:     •Use el dispositivo ortopédico o la férula elisa se lo haya indicado el médico. Quíteselos solamente elisa se lo haya indicado el médico.      •Afloje el dispositivo ortopédico o la férula si siente hormigueo en los dedos de los pies, si se le adormecen, o se le enfrían y se tornan de color flor.      •Mantenga el dispositivo ortopédico y la férula limpios.    •Si el dispositivo ortopédico o la férula no son impermeables:  •No deje que se mojen.       •Cúbralos con un envoltorio hermético cuando tome un baño de inmersión o abdelrahman ducha.        Si tiene abdelrahman venda elástica (vendaje):     •Quítesela para ducharse o para bañarse.      •Trate de no  mucho el tobillo, mariola mueva los dedos de vez en cuando. [place] ayuda a evitar la hinchazón.      •Si siente que el vendaje está muy ajustado, puede aflojarlo un poco para estar más cómodo.      •Afloje el vendaje si tiene adormecimiento u hormigueo en el pie o si lo siente frío y está de color flor.        Control del dolor, la rigidez y la hinchazón   A bag of ice on a towel on the skin.   •Use los medicamentos de venta everton y los recetados solamente elisa se lo haya indicado el médico.      •Cheryl 2 o 3 días, mantenga el tobillo en alto (elevado), por encima del nivel del corazón tanto elisa sea posible.    •Si se lo indican, aplique hielo sobre la bertrand de la lesión:  •Si el dispositivo ortopédico o la férula son desmontables, quíteselos elisa se lo haya indicado el médico.      •Ponga el hielo en abdelrahman bolsa plástica.      •Coloque abdelrahman toalla entre la piel y la bolsa.      •Aplique el hielo cheryl 20 minutos, 2 a 3 veces por día.        Indicaciones generales     •Mantenga el tobillo en reposo.      • No apoye el peso del cuerpo sobre la extremidad lesionada hasta que lo autorice el médico. Use las muletas elisa se lo haya indicado el médico.      • No consuma ningún producto que contenga nicotina o tabaco, elisa cigarrillos, cigarrillos electrónicos y tabaco de mascar. Si necesita ayuda para dejar de consumir estos productos, consulte al médico.      •Concurra a todas las visitas de seguimiento elisa se lo haya indicado el médico. [place] es importante.        Comuníquese con un médico si:    •Le aumenta rápidamente el hematoma o la hinchazón.      •El dolor no se latosha con los medicamentos.        Solicite ayuda de inmediato si:    •El pie o los dedos del pie se le adormecen o se ponen de color flor.      •Siente un dolor intenso que empeora.        Resumen    •El esguince de tobillo es la distensión o el desgarro de un ligamento del tobillo. Los ligamentos son tejidos que conectan los huesos.      •A menudo, esta afección es consecuencia de girar o torcer accidentalmente el tobillo.      •Los síntomas incluyen dolor, hinchazón, moretones y problemas para caminar.      •Para aliviar el dolor y la hinchazón, aplique hielo sobre el tobillo afectado, levante el tobillo por encima del nivel del corazón y use abdelrahman venda elástica.      •Concurra a todas las visitas de seguimiento elisa se lo haya indicado el médico. [place] es importante.      Esta información no tiene elisa fin reemplazar el consejo del médico. Asegúrese de hacerle al médico cualquier pregunta que tenga.

## 2022-11-05 NOTE — ED PROVIDER NOTE - MUSCULOSKELETAL, MLM
Swelling to her ankle, tenderness to medial malleolus. Palpable pedal pulse. Able to move toes. Swelling to right ankle, tenderness to medial malleolus. Palpable pedal pulse. Able to move toes.

## 2022-11-05 NOTE — ED PROVIDER NOTE - NS ED ATTENDING STATEMENT MOD
This was a shared visit with the PILY. I reviewed and verified the documentation and independently performed the documented:

## 2023-11-06 NOTE — ED PROVIDER NOTE - NSFOLLOWUPINSTRUCTIONS_ED_ALL_ED_FT
Dislocación de rótula    LO QUE NECESITA SABER:    ¿Qué es abdelrahman dislocación de la rótula?Abdelrahman dislocación de rótula ocurre cuando el hueso de la rodilla es forzado fuera de christensen lugar.    Anatomía de la rodilla         ¿Qué aumenta mi riesgo de presentar abdelrahman dislocación de rótula?  •Ser alto o tener sobrepeso      •Abdelrahman rótula que se encuentra danay o no está alineada con la articulación de la rodilla      •Músculos de la pierna débiles o inestables      •Abdelrahman lesión pasada en la rodilla o antecedentes familiares de problemas de la rodilla      ¿Cuáles son los signos y síntomas de abdelrahman dislocación de rótula?  •Dolor e inflamación de christensen rodilla      •Ser incapaz de doblar christensen rodilla o no poder aguantar peso sobre la pierna      •El hueso de la rodilla se siente fuera de lugar o abdelrahman rodilla inestable      ¿Cómo se diagnostica abdelrahman dislocación de rótula?Christensen médico le palpará la rótula y le doblará la rodilla, moviéndola en diferentes direcciones. Informe a christensen médico si anteriormente sufrió abdelrahman dislocación en la rótula o abdelrahman lesión en la rodilla. Dígale si tiene antecedentes familiares problemas de la rodilla. Infórmele si tiene otras condiciones médicas.  •Las radiografíaspodrían mostrar imágenes de abdelrahman rótula dislocada y cualquier otra lesión.      •Abdelrahman resonancia magnética (RM) o tomografía computarizada (TC)son posibles procedimientos. A usted le podrían administrar un medio de contraste para que christensen rodilla se pueda observar mejor en las imágenes. Dígale al médico si usted alguna vez ha tenido abdelrahman reacción alérgica al líquido de contraste. No entre a la nettie donde se realiza la resonancia magnética con algo de metal. El metal puede causar lesiones serias. Dígale al médico si usted tiene algo de metal dentro de christensen cuerpo o por encima.      ¿Cuál es el tratamiento para la rodilla dislocada?Es posible que el hueso de la rodilla o rótula vuelva a christensen lugar por sí solo. Si no vuelve a christensen lugar por sí solo, podría necesitar cualquiera de lo siguiente:   •Puede administrarsepodrían administrarse. Pregunte al médico cómo debe rosalba jolene medicamento de forma [place]. Algunos medicamentos recetados para el dolor contienen acetaminofén. No tome otros medicamentos que contengan acetaminofén sin consultarlo con christensen médico. Demasiado acetaminofeno puede causar daño al hígado. Los medicamentos recetados para el dolor podrían causar estreñimiento. Pregunte a christensen médico elisa prevenir o tratar estreñimiento.      •Acetaminofénalivia el dolor y baja la fiebre. Está disponible sin receta médica. Pregunte la cantidad y la frecuencia con que debe tomarlos. Siga las indicaciones. Pop las etiquetas de todos los demás medicamentos que esté usando para saber si también contienen acetaminofén, o pregunte a christensen médico o farmacéutico. El acetaminofén puede causar daño en el hígado cuando no se jonnie de forma correcta. No use más de 4 gramos (4000 miligramos) en total de acetaminofeno en un día.      •Los ANDREY,elisa el ibuprofeno, ayudan a disminuir la inflamación, el dolor y la fiebre. Jolene medicamento está disponible con o sin abdelrahman receta médica. Los ANDREY pueden causar sangrado estomacal o problemas renales en ciertas personas. Si usted esta tomando un anticoágulante,  siempre pregunte si los AINEs son seguros para usted. Siempre pop la etiqueta de jolene medicamento y siga las instrucciones. No administre jolene medicamento a niños menores de 6 meses de alessandro sin antes obtener la autorización de christensen médico.      •Un procedimientopara retroceder la rótula a christensen lugar. Los médicos van a  christensen rodilla y christensen pierna en diferentes direcciones hasta que christensen rótula vuelva a christensen lugar.      •Succiónes un procedimiento para extraer lauri y líquido de la rodilla a través de abdelrahman aguja. [place] reduce la inflamación y el dolor.      •La cirugíapodría ser necesaria si los médicos no pueden retroceder la rótula a christensen lugar. También podría hacerse abdelrahman cirugía si christensen rótula se disloca más de abdelrahman vez.      ¿Qué puedo hacer para controlar abdelrahman dislocación de rótula?  •Aplique hielo.El hielo ayuda a disminuir la inflamación y el dolor, y podría prevenir el daño al tejido. Use abdelrahman compresa de hielo o ponga hielo triturado en abdelrahman bolsa de plástico. Cúbralo con abdelrahman toalla húmeda y colóquelo sobre christensen rodilla por 15 a 20 minutos cada hora o elisa se lo indicaron.      •Eleve la rodilla por encima del nivel del corazón tan a menudo elisa pueda.[place] va a disminuir inflamación y el dolor. Coloque christensen pierna sobre almohadas o cobijas para mantener la rodilla elevada cómodamente.  Elevar la pierna           •Evite que la rodilla se mueva por hasta 6 semanas o elisa se le haya indicado.Christensen médico podría ponerle un yeso o férula. Usted podría necesitar de un yeso en christensen pierna para estabilizar christensen rodilla. El yeso en la pierna puede ajustarse para aumentar christensen rango de movimiento a medida que christensen rodilla [place].  Rodillera articulada           •Use las muletas si se le indica.Es posible que christensen médico le indique que no ponga peso sobre la rodilla lesionada. Christensen médico le mostrará cómo usar las muletas. Es posible que las necesite cheryl 4 a 6 semanas.      •Vaya a fisioterapia según le indicaron.Un fisioterapeuta le enseñará ejercicios para aumentar el rango de movimiento de la rodilla. Los ejercicios hacen que christensen rodilla se fortalezca, aumenta la estabilidad y latosha el dolor. Es posible que también necesite fortalecer christensen estómago, espalda, caderas y los músculos de la pierna. [place] vez le indiquen que continúe con estos ejercicios después de que termine la fisioterapia para prevenir otra dislocación.      ¿Cuándo tanja buscar atención inmediata?  •Christensen rótula se vuelve a dislocar.      •Usted tiene dolor e inflamación intensos en christensen rodilla.      ¿Cuándo tanja llamar a mi médico?  •El dolor en christensen rodilla aumenta.      •Siente christensen rodilla inestable.      •Usted tiene preguntas o inquietudes acerca de christensen condición o cuidado.      ACUERDOS SOBRE CHRISTENSEN CUIDADO:    Usted tiene el derecho de ayudar a planear christensen cuidado. Aprenda todo lo que pueda sobre christensen condición y elisa darle tratamiento. Discuta christina opciones de tratamiento con christina médicos para decidir el cuidado que usted desea recibir. Usted siempre tiene el derecho de rechazar el tratamiento. electronic

## 2023-11-18 NOTE — ED PROVIDER NOTE - RELIEVING FACTORS
Med rec complete per patients daughter at bedside  Allergies reviewed.   Anticoagulants taken in the last 14 days? No     Jenny Sarabia CPhT       none

## 2024-04-09 NOTE — ED PROVIDER NOTE - WET READ LAUNCH FT
Spoke w/ pt. Relayed information below. Pt verbalized understanding.  Pt had to cancel his US of the thyroid, insurance would not cover the cost, he is going to call and find out why they will not cover it.   There are no Wet Read(s) to document.

## 2024-05-10 NOTE — ED PROVIDER NOTE - OBJECTIVE STATEMENT
Problem: Discharge Planning  Goal: Discharge to home or other facility with appropriate resources  5/10/2024 1541 by Gianfranco Da Silva RN  Outcome: Completed  5/10/2024 1501 by Gianfranco Da Silva RN  Outcome: Progressing     Problem: Chronic Conditions and Co-morbidities  Goal: Patient's chronic conditions and co-morbidity symptoms are monitored and maintained or improved  5/10/2024 1541 by Gianfranco Da Silva RN  Outcome: Completed  5/10/2024 1501 by Gianfranco Da Silva RN  Outcome: Progressing     Problem: Safety - Adult  Goal: Free from fall injury  5/10/2024 1541 by Gianfranco Da Silva RN  Outcome: Completed  5/10/2024 1501 by Gianfranco Da Silva RN  Outcome: Progressing     Problem: Skin/Tissue Integrity  Goal: Absence of new skin breakdown  Description: 1.  Monitor for areas of redness and/or skin breakdown  2.  Assess vascular access sites hourly  3.  Every 4-6 hours minimum:  Change oxygen saturation probe site  4.  Every 4-6 hours:  If on nasal continuous positive airway pressure, respiratory therapy assess nares and determine need for appliance change or resting period.  5/10/2024 1541 by Gianfranco Da Silva RN  Outcome: Completed  5/10/2024 1501 by Gianfranco Da Silva RN  Outcome: Progressing     Problem: Nutrition Deficit:  Goal: Optimize nutritional status  5/10/2024 1541 by Gianfranco Da Silva RN  Outcome: Completed  5/10/2024 1501 by Gianfranco Da Silva RN  Outcome: Progressing      60 year old female with no known PMHx presenting here to the ED with complaints of a sudden onset of pressure sensation in her chest since last night. Patient relates that it feels as though the wind has been knocked out of her and that she cannot take deep breaths. Patient denies nausea, vomiting, diaphoresis, leg swelling or pain, or any other acute complaints. Patient with history of COVID in March.

## 2024-07-08 NOTE — ED ADULT TRIAGE NOTE - TEMPERATURE IN CELSIUS (DEGREES C)
Smith Love    Chief Complaint   Patient presents with    Head pressure        History of Present Illness:   Ms. Love comes in today with complaint of having wheezing, cough productive of clear, thick phlegm with occasional shortness of breath with chest tightness for 1 week.  She states she has been using nebulizer every 3-4 hours for 2 days with help as MDI use was no longer helpful.  She states she has not use nebulizer today.  She states she continues to take Singulair 10 mg daily.  She states she does not smoke cigarettes.  She has asthma.    She states she had head pressure on/about June 30, 2024 for few days.  During that time, she states her scalp at the top of her head was tender to touch and she had facial pain.  She states she took Tylenol and applied skin drops to the top of her scalp with help.  She states symptoms have resolved except she continues to have slight runny nose.    Otherwise, she denies having fever, chills, fatigue, appetite changes; other sinus symptoms; chest pain, palpitations, leg swelling; abdominal pain, nausea, vomiting, diarrhea, constipation; unusual urinary symptoms; polydipsia, polyphagia, polyuria, hot or cold intolerance; back pain; headache; anxiety, depression, homicidal or suicidal thoughts.               Labs:               WBC                      5.52                11/17/2023                 HGB                      13.6                11/17/2023                 HCT                      40.1                11/17/2023                 PLT                      294                 11/17/2023                 CHOL                     106 (L)             03/12/2021                 TRIG                     30                  03/12/2021                 HDL                      42                  03/12/2021                 ALT                      21                  11/17/2023                 AST                      16                  11/17/2023                  NA                       139                 11/17/2023                 K                        3.5                 11/17/2023                 CL                       103                 11/17/2023                 CREATININE               0.9                 11/17/2023                 BUN                      7                   11/17/2023                 CO2                      24                  11/17/2023                 TSH                      2.418               10/26/2023                 GLUF                     148 (H)             08/29/2018                 HGBA1C                   8.2 (H)             11/09/2023               LDLCALC                  58.0 (L)            03/12/2021                  Current Outpatient Medications   Medication Sig    albuterol (PROVENTIL) 2.5 mg /3 mL (0.083 %) nebulizer solution Take 3 mLs (2.5 mg total) by nebulization every 4 to 6 hours as needed for Wheezing or Shortness of Breath.    albuterol (VENTOLIN HFA) 90 mcg/actuation inhaler Inhale 2 puffs into the lungs every 6 (six) hours as needed for Wheezing. Inhaler    amLODIPine (NORVASC) 2.5 MG tablet Take 1 tablet by mouth once daily    amLODIPine (NORVASC) 5 MG tablet Take 1 tablet (5 mg total) by mouth once daily. Take with 2.5 mg for total 7.5 mg daily.    azelastine (ASTELIN) 137 mcg (0.1 %) nasal spray 1 spray (137 mcg total) by Nasal route 2 (two) times daily.    busPIRone (BUSPAR) 5 MG Tab Take 5 mg by mouth 3 (three) times daily.    clobetasoL (TEMOVATE) 0.05 % external solution Apply topically once daily. Apply to the scalp    diclofenac sodium (VOLTAREN) 1 % Gel Apply 2 g topically 4 (four) times daily. for 10 days    fluticasone-salmeterol diskus inhaler 250-50 mcg Inhale 1 puff into the lungs 2 (two) times daily. Controller    hydroCHLOROthiazide (HYDRODIURIL) 25 MG tablet Take 1 tablet (25 mg total) by mouth once daily.    ibuprofen (ADVIL,MOTRIN) 800 MG tablet Take 1 tablet (800 mg total) by mouth 3  (three) times daily.    ketoconazole (NIZORAL) 2 % shampoo Apply topically twice a week.    mometasone 0.1% (ELOCON) 0.1 % cream Apply topically once daily.    montelukast (SINGULAIR) 10 mg tablet Take 1 tablet (10 mg total) by mouth every evening.    blood sugar diagnostic Strp 1 each by Misc.(Non-Drug; Combo Route) route 3 (three) times daily. (Patient not taking: Reported on 7/8/2024)    blood-glucose meter (BLOOD GLUCOSE MONITORING) kit Use as instructed (Patient not taking: Reported on 7/8/2024)       Review of Systems   Constitutional:  Negative for activity change, appetite change, chills, fatigue and fever.   HENT:  Positive for rhinorrhea. Negative for congestion, postnasal drip, sinus pressure, sinus pain and sneezing.         See history of present illness.   Respiratory:  Positive for cough, chest tightness, shortness of breath and wheezing.    Cardiovascular:  Negative for chest pain, palpitations and leg swelling.   Gastrointestinal:  Negative for abdominal pain, constipation, diarrhea, nausea and vomiting.   Endocrine: Negative for cold intolerance, heat intolerance, polydipsia, polyphagia and polyuria.   Genitourinary:  Negative for difficulty urinating.   Musculoskeletal:  Negative for back pain.   Neurological:  Negative for numbness and headaches.        See history of present illness.   Psychiatric/Behavioral:  Negative for dysphoric mood and suicidal ideas. The patient is not nervous/anxious.         Negative for homicidal ideas.       Objective:  Physical Exam  Vitals reviewed.   Constitutional:       General: She is not in acute distress.     Appearance: Normal appearance. She is obese. She is not ill-appearing, toxic-appearing or diaphoretic.      Comments: Pleasant.   HENT:      Head: Normocephalic and atraumatic.      Comments: Non tender sinuses.     Right Ear: Tympanic membrane, ear canal and external ear normal. There is no impacted cerumen.      Left Ear: Tympanic membrane, ear canal  and external ear normal. There is no impacted cerumen.      Nose: Congestion present. No rhinorrhea.      Mouth/Throat:      Mouth: Mucous membranes are moist.      Pharynx: Oropharynx is clear. No oropharyngeal exudate or posterior oropharyngeal erythema.   Eyes:      General:         Right eye: No discharge.         Left eye: No discharge.      Extraocular Movements: Extraocular movements intact.      Conjunctiva/sclera: Conjunctivae normal.      Pupils: Pupils are equal, round, and reactive to light.      Comments: She wears glasses.   Cardiovascular:      Rate and Rhythm: Normal rate and regular rhythm.      Pulses: Normal pulses.      Heart sounds: No murmur heard.  Pulmonary:      Effort: Pulmonary effort is normal. No respiratory distress.      Breath sounds: Normal breath sounds. No wheezing.   Abdominal:      General: Bowel sounds are normal. There is no distension.      Palpations: Abdomen is soft. There is no mass.      Tenderness: There is no abdominal tenderness. There is no guarding or rebound.   Musculoskeletal:         General: No swelling or tenderness. Normal range of motion.      Cervical back: Normal range of motion and neck supple. No tenderness.      Comments: She is ambulatory without problems.   Lymphadenopathy:      Cervical: No cervical adenopathy.   Skin:     General: Skin is warm.   Neurological:      General: No focal deficit present.      Mental Status: She is alert and oriented to person, place, and time.   Psychiatric:         Mood and Affect: Mood normal.         Behavior: Behavior normal.         Thought Content: Thought content normal.         Judgment: Judgment normal.         ASSESSMENT:  1. Mild asthma with acute exacerbation, unspecified whether persistent    2. Seasonal allergic rhinitis, unspecified trigger    3. Obesity (BMI 30.0-34.9)        PLAN:  Smith was seen today for head pressure .    Diagnoses and all orders for this visit:    Mild asthma with acute  exacerbation, unspecified whether persistent  -     methylPREDNISolone (MEDROL DOSEPACK) 4 mg tablet; use as directed    Seasonal allergic rhinitis, unspecified trigger  -     methylPREDNISolone (MEDROL DOSEPACK) 4 mg tablet; use as directed    Obesity (BMI 30.0-34.9)        Continue current medications, follow low sodium, low cholesterol, low carb diet, daily walks.  Add Medrol dose pack as directed; medication precautions discussed with patient (including advised patient may cause temporary elevation of glucose).  Keep follow up with specialists.  Flu shot this fall.  Follow up if symptoms worsen or fail to improve.         36.8

## 2025-09-18 ENCOUNTER — RESULT REVIEW (OUTPATIENT)
Age: 65
End: 2025-09-18

## 2025-09-18 ENCOUNTER — TRANSCRIPTION ENCOUNTER (OUTPATIENT)
Age: 65
End: 2025-09-18